# Patient Record
Sex: MALE | Race: WHITE | Employment: FULL TIME | URBAN - METROPOLITAN AREA
[De-identification: names, ages, dates, MRNs, and addresses within clinical notes are randomized per-mention and may not be internally consistent; named-entity substitution may affect disease eponyms.]

---

## 2019-09-25 ENCOUNTER — HOSPITAL ENCOUNTER (OUTPATIENT)
Dept: LAB | Facility: CLINIC | Age: 44
Discharge: HOME | End: 2019-09-25
Attending: PSYCHIATRY & NEUROLOGY | Admitting: PSYCHIATRY & NEUROLOGY
Payer: COMMERCIAL

## 2019-09-25 DIAGNOSIS — F32.9 MDD (MAJOR DEPRESSIVE DISORDER): ICD-10-CM

## 2019-09-25 DIAGNOSIS — F41.1 GAD (GENERALIZED ANXIETY DISORDER): Primary | ICD-10-CM

## 2019-09-25 LAB
ALBUMIN SERPL-MCNC: 3.9 G/DL (ref 3.4–5)
ALP SERPL-CCNC: 81 U/L (ref 40–150)
ALT SERPL W P-5'-P-CCNC: 63 U/L (ref 0–70)
AMYLASE SERPL-CCNC: 58 U/L (ref 30–110)
ANION GAP SERPL CALCULATED.3IONS-SCNC: 2 MMOL/L (ref 3–14)
AST SERPL W P-5'-P-CCNC: 27 U/L (ref 0–45)
BASOPHILS # BLD AUTO: 0.2 10E9/L (ref 0–0.2)
BASOPHILS NFR BLD AUTO: 1.5 %
BILIRUB SERPL-MCNC: 0.3 MG/DL (ref 0.2–1.3)
BUN SERPL-MCNC: 8 MG/DL (ref 7–30)
CALCIUM SERPL-MCNC: 9.3 MG/DL (ref 8.5–10.1)
CHLORIDE SERPL-SCNC: 104 MMOL/L (ref 94–109)
CO2 SERPL-SCNC: 31 MMOL/L (ref 20–32)
CREAT SERPL-MCNC: 0.8 MG/DL (ref 0.66–1.25)
DIFFERENTIAL METHOD BLD: ABNORMAL
EOSINOPHIL # BLD AUTO: 0.2 10E9/L (ref 0–0.7)
EOSINOPHIL NFR BLD AUTO: 1.5 %
ERYTHROCYTE [DISTWIDTH] IN BLOOD BY AUTOMATED COUNT: 14 % (ref 10–15)
GFR SERPL CREATININE-BSD FRML MDRD: >90 ML/MIN/{1.73_M2}
GLUCOSE SERPL-MCNC: 111 MG/DL (ref 70–99)
HCT VFR BLD AUTO: 45 % (ref 40–53)
HGB BLD-MCNC: 14.4 G/DL (ref 13.3–17.7)
IMM GRANULOCYTES # BLD: 0.1 10E9/L (ref 0–0.4)
IMM GRANULOCYTES NFR BLD: 1.1 %
LYMPHOCYTES # BLD AUTO: 2.2 10E9/L (ref 0.8–5.3)
LYMPHOCYTES NFR BLD AUTO: 20.7 %
MCH RBC QN AUTO: 30.3 PG (ref 26.5–33)
MCHC RBC AUTO-ENTMCNC: 32 G/DL (ref 31.5–36.5)
MCV RBC AUTO: 95 FL (ref 78–100)
MONOCYTES # BLD AUTO: 0.7 10E9/L (ref 0–1.3)
MONOCYTES NFR BLD AUTO: 6.8 %
NEUTROPHILS # BLD AUTO: 7.3 10E9/L (ref 1.6–8.3)
NEUTROPHILS NFR BLD AUTO: 68.4 %
NRBC # BLD AUTO: 0 10*3/UL
NRBC BLD AUTO-RTO: 0 /100
PLATELET # BLD AUTO: 524 10E9/L (ref 150–450)
POTASSIUM SERPL-SCNC: 4.5 MMOL/L (ref 3.4–5.3)
PROT SERPL-MCNC: 8.4 G/DL (ref 6.8–8.8)
RBC # BLD AUTO: 4.75 10E12/L (ref 4.4–5.9)
SODIUM SERPL-SCNC: 137 MMOL/L (ref 133–144)
VIT B12 SERPL-MCNC: 1247 PG/ML (ref 193–986)
WBC # BLD AUTO: 10.7 10E9/L (ref 4–11)

## 2019-09-25 PROCEDURE — 82306 VITAMIN D 25 HYDROXY: CPT | Performed by: PSYCHIATRY & NEUROLOGY

## 2019-09-25 PROCEDURE — 80053 COMPREHEN METABOLIC PANEL: CPT | Performed by: PSYCHIATRY & NEUROLOGY

## 2019-09-25 PROCEDURE — 36415 COLL VENOUS BLD VENIPUNCTURE: CPT | Performed by: PSYCHIATRY & NEUROLOGY

## 2019-09-25 PROCEDURE — 82607 VITAMIN B-12: CPT | Performed by: PSYCHIATRY & NEUROLOGY

## 2019-09-25 PROCEDURE — 82150 ASSAY OF AMYLASE: CPT | Performed by: PSYCHIATRY & NEUROLOGY

## 2019-09-25 PROCEDURE — 85025 COMPLETE CBC W/AUTO DIFF WBC: CPT | Performed by: PSYCHIATRY & NEUROLOGY

## 2019-09-26 LAB — DEPRECATED CALCIDIOL+CALCIFEROL SERPL-MC: 31 UG/L (ref 20–75)

## 2020-12-04 ENCOUNTER — TELEPHONE (OUTPATIENT)
Dept: FAMILY MEDICINE | Facility: CLINIC | Age: 45
End: 2020-12-04

## 2020-12-04 NOTE — TELEPHONE ENCOUNTER
General Call:     Who is calling:  Patient    Reason for Call:  Artur is interested in GeneSight testing. He wants to know what needs to happen to get this testing done.    Okay to leave a detailed message:Yes at Home number on file [phone] (home)

## 2020-12-22 ENCOUNTER — OFFICE VISIT (OUTPATIENT)
Dept: FAMILY MEDICINE | Facility: CLINIC | Age: 45
End: 2020-12-22
Payer: COMMERCIAL

## 2020-12-22 VITALS
WEIGHT: 156 LBS | DIASTOLIC BLOOD PRESSURE: 72 MMHG | OXYGEN SATURATION: 100 % | BODY MASS INDEX: 21.84 KG/M2 | TEMPERATURE: 95.5 F | HEIGHT: 71 IN | SYSTOLIC BLOOD PRESSURE: 112 MMHG | HEART RATE: 92 BPM

## 2020-12-22 DIAGNOSIS — F41.9 ANXIETY: ICD-10-CM

## 2020-12-22 DIAGNOSIS — F10.21 ALCOHOL USE DISORDER, SEVERE, IN EARLY REMISSION (H): ICD-10-CM

## 2020-12-22 DIAGNOSIS — F33.1 MODERATE EPISODE OF RECURRENT MAJOR DEPRESSIVE DISORDER (H): Primary | ICD-10-CM

## 2020-12-22 PROCEDURE — 99204 OFFICE O/P NEW MOD 45 MIN: CPT | Performed by: PHYSICIAN ASSISTANT

## 2020-12-22 SDOH — HEALTH STABILITY: MENTAL HEALTH: HOW MANY STANDARD DRINKS CONTAINING ALCOHOL DO YOU HAVE ON A TYPICAL DAY?: NOT ASKED

## 2020-12-22 SDOH — HEALTH STABILITY: MENTAL HEALTH: HOW OFTEN DO YOU HAVE 6 OR MORE DRINKS ON ONE OCCASION?: NOT ASKED

## 2020-12-22 SDOH — HEALTH STABILITY: MENTAL HEALTH: HOW OFTEN DO YOU HAVE A DRINK CONTAINING ALCOHOL?: NOT ASKED

## 2020-12-22 ASSESSMENT — MIFFLIN-ST. JEOR: SCORE: 1614.74

## 2020-12-22 NOTE — PROGRESS NOTES
SUBJECTIVE:   Artur Gee is a 45 year old male who presents to clinic today for the following health issues:    Gene sight testing:   Pt is requesting gene sight testing and would like to have the results sent to psychiatrist. Dr. Tai Wade, [facility] in [place] - seeing since 2013.  Inpatient ETOH treatment twice.  Currently has been sober for 15 months.  Sees his therapist weekly.  Dr. Wade's assistant is Shashi and phone number is [phone].    Sertraline - started a week ago -so far no issues  buproprion - tried in 2014/2015 - unclear if   Lithium - stopped taking it as he did not agree with the diagnosis of bipolar disorder  Citalopram - sarah  escitalopram - not effective  Lamotrigine - rash - discontinued 7/2020  Paroxetine - no issues, RX'd on accident.    Strattera - stopped taking       Problem list and histories reviewed & adjusted, as indicated.  Additional history: as documented    Patient Active Problem List   Diagnosis     Depression, major     Alcohol abuse, episodic     Alcohol abuse, daily use     Anxiety     Sarah (H)     Alcohol use disorder, severe, in early remission (H)     Past Surgical History:   Procedure Laterality Date     NO HISTORY OF SURGERY         Social History     Tobacco Use     Smoking status: Current Every Day Smoker     Packs/day: 1.00     Types: Cigarettes     Smokeless tobacco: Never Used   Substance Use Topics     Alcohol use: Yes     Comment: Drinks more than 24 cans of beer in a week 4/15/2013     Family History   Problem Relation Age of Onset     No Known Problems Mother      No Known Problems Father          Current Outpatient Medications   Medication Sig Dispense Refill     sertraline (ZOLOFT) 50 MG tablet TAKE 1/2 TABLET BY MOUTH EVERY DAY X 1 WEEK THEN 1 TABLET BY MOUTH DAILY       Allergies   Allergen Reactions     No Known Drug Allergy        Reviewed and updated as needed this visit by clinical staff  Tobacco  Allergies  Meds   "Problems  Med Hx  Surg Hx  Fam Hx  Soc Hx        Reviewed and updated as needed this visit by Provider  Tobacco  Allergies  Meds  Problems  Med Hx  Surg Hx  Fam Hx  Soc Hx          ROS:  Constitutional, HEENT, cardiovascular, pulmonary, GI, , musculoskeletal, neuro, skin, endocrine and psych systems are negative, except as otherwise noted.    OBJECTIVE:   /72   Pulse 92   Temp 95.5  F (35.3  C) (Tympanic)   Ht 1.803 m (5' 11\")   Wt 70.8 kg (156 lb)   SpO2 100%   BMI 21.76 kg/m   Body mass index is 21.76 kg/m .      GENERAL: healthy, alert and no distress  EYES: Eyes grossly normal to inspection  MSK: no visible deformities.    SKIN: no suspicious lesions or rashes  NEURO: Normal strength and tone, mentation intact and speech normal  PSYCH: mentation appears normal, affect normal/bright    Diagnostic Test Results:  none   ASSESSMENT/PLAN:   Artur was seen today for genetic counseling.    Diagnoses and all orders for this visit:    Moderate episode of recurrent major depressive disorder (H)  Alcohol use disorder, severe, in early remission (H)  Anxiety  Discussed at length the process for GeneSight testing and indications for doing so.  Patient has been on numerous medications over the years.  His psychiatrist Dr. Wade desires GeneSight testing so that he can utilize this in his treatment plan.  This will be performed today.  I will keep him apprised of the results.    Greater than 30 minutes were spent with the patient. The majority of this time was coordinating care and counseling regarding the above diagnosis .    Return in about 1 week (around 12/29/2020) for Via phone for results..     [address] [address]  [email or web address]   Office: [phone]           Mariluz Mccarthy, MS, PA-C        "

## 2020-12-28 ENCOUNTER — TELEPHONE (OUTPATIENT)
Dept: FAMILY MEDICINE | Facility: CLINIC | Age: 45
End: 2020-12-28

## 2020-12-28 DIAGNOSIS — Z15.89 HETEROZYGOUS MTHFR MUTATION C677T: Primary | ICD-10-CM

## 2020-12-28 NOTE — LETTER
" [facility]  [address] [place] [address]  [phone]                    December 31, 2020    Artur Corrigan [place] [address]      Dear Artur,    Here is a summary of your recent test results:    The vast majority of the antidepressant medications on the market in the [place] her lying within his yellow \"use with caution\" column.  This includes his current sertraline medication.  The clinical considerations noted for this medication is that his genotype may impact the drug mechanism of action and result in reduced efficacy.  This does not mean that this medication will not work for him.  The previously tried paroxetine, citalopram, and bupropion are all also within the yellow column.  The medications that lie within the green \"use as directed \"column include Pristiq, Fetzima, and Viibryd.     Regarding previous mood stabilizers that were utilized the Lamictal lies within his green \"use as directed \"column.  Lithium does not have a genetic marker.     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  Based on this result it is recommended that he take a daily folic acid supplement of 400 mcg once daily.  This will improve the success of any medications utilized for mental health purposes.      Please contact me if you have any questions.    In addition, here is a list of due or overdue Health Maintenance reminders.    Health Maintenance Due   Topic Date Due     Preventive Care Visit  1975     ANNUAL REVIEW OF HM ORDERS  1975     Pneumococcal Vaccine (1 of 1 - PPSV23) 02/08/1981     HIV Screening  02/08/1990     Hepatitis C Screening  02/08/1993     Cholesterol Lab  04/20/2018       Please call us at [phone] (or use [employer]) to address the above recommendations.            Thank you very much for trusting [facility]..     Healthy regards,      NADIYA WhiteheadC / " DIANE-RN      No results found for any visits on 12/28/20.

## 2020-12-29 NOTE — TELEPHONE ENCOUNTER
"Triage: Please call patient to discuss GeneSight results.    The vast majority of the antidepressant medications on the market in the [place] her lying within his yellow \"use with caution\" column.  This includes his current sertraline medication.  The clinical considerations noted for this medication is that his genotype may impact the drug mechanism of action and result in reduced efficacy.  This does not mean that this medication will not work for him.  The previously tried paroxetine, citalopram, and bupropion are all also within the yellow column.  The medications that lie within the green \"use as directed \"column include Pristiq, Fetzima, and Viibryd.    Regarding previous mood stabilizers that were utilized the Lamictal lies within his green \"use as directed \"column.  Lithium does not have a genetic marker.    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  Based on this result it is recommended that he take a daily folic acid supplement of 400 mcg once daily.  This will improve the success of any medications utilized for mental health purposes.    Please email a copy of these results to the patient via scanner on the printer.  [email or web address]   At our visit he did sign the required electronic release of information agreement.    Once patient is informed of these results please notify Ale DELUCA,  and she will mail a hard copy of the results as well as fax a copy of the results to Dr. Tai Wade with [facility] (his assistant is Shashi - phone [phone])      Mariluz Mccarthy MBA, MS, PA-C    "

## 2020-12-29 NOTE — TELEPHONE ENCOUNTER
Attempt # 1    Called #   [phone] (home)       Left a non detailed VM     Karina Rice RN, BSN  [facility]

## 2020-12-31 NOTE — TELEPHONE ENCOUNTER
Attempt # 2  Called # [phone]     Letter sent    Left a non detailed VM to call back at [phone] and ask for any available Triage Nurse.    Terence Mckeon RN   [facility]

## 2021-01-14 ENCOUNTER — NURSE TRIAGE (OUTPATIENT)
Dept: NURSING | Facility: CLINIC | Age: 46
End: 2021-01-14

## 2021-01-14 NOTE — TELEPHONE ENCOUNTER
Patient asking for prior [facility] triage, needs gene testing orders.  Call transferred.    Karina Kaur RN  [facility] Nurse Advisor

## 2021-01-14 NOTE — TELEPHONE ENCOUNTER
Patient returned call, read Mariluz Mccarthy's message to him and advised him that GeneSight testing results were placed in the mail to him today.  FRANNY Tripp R.N.